# Patient Record
Sex: MALE | Race: BLACK OR AFRICAN AMERICAN | NOT HISPANIC OR LATINO | Employment: PART TIME | URBAN - METROPOLITAN AREA
[De-identification: names, ages, dates, MRNs, and addresses within clinical notes are randomized per-mention and may not be internally consistent; named-entity substitution may affect disease eponyms.]

---

## 2020-02-07 ENCOUNTER — HOSPITAL ENCOUNTER (EMERGENCY)
Facility: CLINIC | Age: 35
Discharge: HOME | End: 2020-02-07
Attending: FAMILY MEDICINE | Admitting: FAMILY MEDICINE
Payer: COMMERCIAL

## 2020-02-07 ENCOUNTER — APPOINTMENT (OUTPATIENT)
Dept: GENERAL RADIOLOGY | Facility: CLINIC | Age: 35
End: 2020-02-07
Attending: FAMILY MEDICINE
Payer: COMMERCIAL

## 2020-02-07 VITALS
TEMPERATURE: 102.4 F | RESPIRATION RATE: 16 BRPM | SYSTOLIC BLOOD PRESSURE: 132 MMHG | DIASTOLIC BLOOD PRESSURE: 67 MMHG | OXYGEN SATURATION: 99 % | HEART RATE: 98 BPM

## 2020-02-07 DIAGNOSIS — J06.9 VIRAL URI WITH COUGH: ICD-10-CM

## 2020-02-07 LAB
FLUAV+FLUBV AG SPEC QL: NEGATIVE
FLUAV+FLUBV AG SPEC QL: NEGATIVE
SPECIMEN SOURCE: NORMAL

## 2020-02-07 PROCEDURE — 25000132 ZZH RX MED GY IP 250 OP 250 PS 637: Performed by: FAMILY MEDICINE

## 2020-02-07 PROCEDURE — 87804 INFLUENZA ASSAY W/OPTIC: CPT | Performed by: FAMILY MEDICINE

## 2020-02-07 PROCEDURE — 99284 EMERGENCY DEPT VISIT MOD MDM: CPT | Mod: 25 | Performed by: FAMILY MEDICINE

## 2020-02-07 PROCEDURE — 99284 EMERGENCY DEPT VISIT MOD MDM: CPT | Mod: Z6 | Performed by: FAMILY MEDICINE

## 2020-02-07 PROCEDURE — 71046 X-RAY EXAM CHEST 2 VIEWS: CPT

## 2020-02-07 RX ORDER — IBUPROFEN 200 MG
600 TABLET ORAL EVERY 6 HOURS PRN
Qty: 30 TABLET | Refills: 0 | Status: SHIPPED | OUTPATIENT
Start: 2020-02-07

## 2020-02-07 RX ORDER — ACETAMINOPHEN 500 MG
1000 TABLET ORAL ONCE
Status: COMPLETED | OUTPATIENT
Start: 2020-02-07 | End: 2020-02-07

## 2020-02-07 RX ADMIN — ACETAMINOPHEN 1000 MG: 500 TABLET ORAL at 19:25

## 2020-02-07 NOTE — ED AVS SNAPSHOT
[employer], [employer], Emergency Department  [address]  [facility] [address]  Phone:  [phone]  Fax:  [phone]                                    Amanuel Phillips   MRN: [id number]    Department:  [facility], Emergency Department   Date of Visit:  2/7/2020           After Visit Summary Signature Page    I have received my discharge instructions, and my questions have been answered. I have discussed any challenges I see with this plan with the nurse or doctor.    ..........................................................................................................................................  Patient/Patient Representative Signature      ..........................................................................................................................................  Patient Representative Print Name and Relationship to Patient    ..................................................               ................................................  Date                                   Time    ..........................................................................................................................................  Reviewed by Signature/Title    ...................................................              ..............................................  Date                                               Time          22EPIC Rev 08/18

## 2020-02-07 NOTE — LETTER
February 7, 2020      To Whom It May Concern:      Amanuel Phillips was seen in our Emergency Department today, 02/07/20.  I expect his condition to improve over the next 2-3 days.  He may return to work/school when improved.    Sincerely,        Vladimir Ramirez MD

## 2020-02-08 NOTE — ED PROVIDER NOTES
[facility] EMERGENCY DEPARTMENT ([facility])     February 7, 2020    History     Chief Complaint   Patient presents with     URI     HPI  Amanuel Phillips is a 35 year old male who presents to the ED for evaluation of a fever and dry cough. Patient reports his symptoms started 2 days ago. He states he has not checked his temperature, but he feels very hot at night. He denies runny nose, sore throat, myalgias, shortness of breath, abdominal pain, or swelling in his bilateral lower extremities. Patient denies recent travel. He denies ill contacts. He is a non-smoker. He reports he did not take Tylenol or Ibuprofen today. He denies previous history of asthma. He states he is otherwise healthy.    PAST MEDICAL HISTORY  History reviewed. No pertinent past medical history.  PAST SURGICAL HISTORY  History reviewed. No pertinent surgical history.  FAMILY HISTORY  History reviewed. No pertinent family history.  SOCIAL HISTORY  Social History     Tobacco Use     Smoking status: Never Smoker     Smokeless tobacco: Never Used   Substance Use Topics     Alcohol use: Not Currently     MEDICATIONS  No current facility-administered medications for this encounter.      Current Outpatient Medications   Medication     Benzocaine-Menthol 10-2.1 MG LOZG     ibuprofen (ADVIL/MOTRIN) 200 MG tablet     ALLERGIES  Allergies   Allergen Reactions     Bactrim [Sulfamethoxazole W/Trimethoprim] Rash              I have reviewed the Medications, Allergies, Past Medical and Surgical History, and Social History in the Epic system.    Review of Systems     ROS: 14 point ROS neg other than the symptoms noted above in the HPI.      Physical Exam   BP: 132/67  Pulse: 98  Temp: 102.4  F (39.1  C)  Resp: 16  SpO2: 99 %      Physical Exam  Constitutional:       General: He is not in acute distress.     Appearance: He is not diaphoretic.   HENT:      Head: Atraumatic.   Eyes:      General: No scleral icterus.     Pupils: Pupils are equal, round,  and reactive to light.   Neck:      Musculoskeletal: Neck supple.   Cardiovascular:      Heart sounds: Normal heart sounds.   Pulmonary:      Effort: No respiratory distress.      Breath sounds: Normal breath sounds.   Abdominal:      General: Bowel sounds are normal.      Palpations: Abdomen is soft.      Tenderness: There is no abdominal tenderness.   Musculoskeletal:         General: No tenderness.   Lymphadenopathy:      Cervical: No cervical adenopathy.   Skin:     General: Skin is warm.      Findings: No rash.   Neurological:      General: No focal deficit present.      Mental Status: He is alert.         ED Course        Procedures                           Labs Ordered and Resulted from Time of ED Arrival Up to the Time of Departure from the ED   INFLUENZA A/B ANTIGEN            Assessments & Plan (with Medical Decision Making)   Otherwise healthy 35-year-old man presenting with 2-day history of cough and fever.   Initial differential diagnosis includes acute upper respiratory infection (viral or less likely bacterial), acute or chronic bronchitis, pneumonia (again either bacterial or viral), airway tract inflammation due to allergies, asthma, pneumothorax, less likely other life-threatening causes of cough or shortness of breath such as airway foreign body, congestive heart failure or pulmonary embolism.  On exam he is elevated temperature, he is in no respiratory distress, oxygen saturation normal.  Examination of his oropharynx is normal.  His lungs are clear.  Cardiovascular exam normal.  No signs of volume overload.  No extremity tenderness or edema.  The remainder of a complete physical exam is normal.  His influenza swab was negative his chest x-ray is negative.  He improved with Tylenol.  This is most likely viral upper respiratory infection.  [place] have direct contact with [employer].  We will treat symptomatically for viral URI.  We discussed the indications for  emergency department return and follow-up.  Stable for discharge.      I have reviewed the nursing notes.    I have reviewed the findings, diagnosis, plan and need for follow up with the patient.    New Prescriptions    BENZOCAINE-MENTHOL 10-2.1 MG LOZG    Take 1 lozenge by mouth 4 times daily as needed (Cough or sore throat)    IBUPROFEN (ADVIL/MOTRIN) 200 MG TABLET    Take 3 tablets (600 mg) by mouth every 6 hours as needed for mild pain       Final diagnoses:   Viral URI with cough     I, Bri Sena, am serving as a trained medical scribe to document services personally performed by Vladimir Ramirez MD, based on the provider's statements to me.      I, Vldaimir Ramirez MD, was physically present and have reviewed and verified the accuracy of this note documented by Bri Sena.     2/7/2020   [facility], [place], EMERGENCY DEPARTMENT     Vladimir Ramirez MD  02/07/20 2003

## 2020-02-08 NOTE — DISCHARGE INSTRUCTIONS
Thank you for choosing [facility].     Please closely monitor for further symptoms. Return to the Emergency Department if you develop any new or worsening signs or symptoms.    If you received any opiate pain medications or sedatives during your visit, please do not drive for at least 8 hours.     Labs, cultures or final xray interpretations may still need to be reviewed.  We will call you if your plan of care needs to be changed.    Please follow up with your primary care physician or clinic.

## 2020-02-08 NOTE — ED TRIAGE NOTES
Pt complaining of URI signs and symptoms for the past few days with a fever.  Pt placed in a mask.

## 2022-03-25 ENCOUNTER — MEDICAL CORRESPONDENCE (OUTPATIENT)
Dept: HEALTH INFORMATION MANAGEMENT | Facility: CLINIC | Age: 37
End: 2022-03-25
Payer: COMMERCIAL

## 2022-03-28 ENCOUNTER — TRANSCRIBE ORDERS (OUTPATIENT)
Dept: OTHER | Age: 37
End: 2022-03-28
Payer: COMMERCIAL

## 2022-03-28 DIAGNOSIS — E11.9 TYPE 2 DIABETES MELLITUS WITHOUT COMPLICATION, WITHOUT LONG-TERM CURRENT USE OF INSULIN (H): Primary | ICD-10-CM

## 2022-05-04 ENCOUNTER — TELEPHONE (OUTPATIENT)
Dept: OPHTHALMOLOGY | Facility: CLINIC | Age: 37
End: 2022-05-04
Payer: COMMERCIAL

## 2023-03-22 ENCOUNTER — APPOINTMENT (OUTPATIENT)
Dept: GENERAL RADIOLOGY | Facility: CLINIC | Age: 38
End: 2023-03-22
Attending: EMERGENCY MEDICINE
Payer: COMMERCIAL

## 2023-03-22 ENCOUNTER — HOSPITAL ENCOUNTER (EMERGENCY)
Facility: CLINIC | Age: 38
Discharge: HOME | End: 2023-03-22
Attending: EMERGENCY MEDICINE | Admitting: EMERGENCY MEDICINE
Payer: COMMERCIAL

## 2023-03-22 VITALS
WEIGHT: 173.4 LBS | SYSTOLIC BLOOD PRESSURE: 137 MMHG | RESPIRATION RATE: 16 BRPM | HEART RATE: 89 BPM | OXYGEN SATURATION: 100 % | TEMPERATURE: 98.1 F | DIASTOLIC BLOOD PRESSURE: 81 MMHG

## 2023-03-22 DIAGNOSIS — R07.9 CHEST PAIN, UNSPECIFIED TYPE: ICD-10-CM

## 2023-03-22 DIAGNOSIS — K21.9 GASTROESOPHAGEAL REFLUX DISEASE, UNSPECIFIED WHETHER ESOPHAGITIS PRESENT: ICD-10-CM

## 2023-03-22 PROCEDURE — 250N000009 HC RX 250: Performed by: EMERGENCY MEDICINE

## 2023-03-22 PROCEDURE — 71046 X-RAY EXAM CHEST 2 VIEWS: CPT

## 2023-03-22 PROCEDURE — 93005 ELECTROCARDIOGRAM TRACING: CPT | Performed by: EMERGENCY MEDICINE

## 2023-03-22 PROCEDURE — 93010 ELECTROCARDIOGRAM REPORT: CPT | Performed by: EMERGENCY MEDICINE

## 2023-03-22 PROCEDURE — 99284 EMERGENCY DEPT VISIT MOD MDM: CPT | Mod: 25 | Performed by: EMERGENCY MEDICINE

## 2023-03-22 PROCEDURE — 250N000013 HC RX MED GY IP 250 OP 250 PS 637: Performed by: EMERGENCY MEDICINE

## 2023-03-22 RX ADMIN — ALUMINUM HYDROXIDE, MAGNESIUM HYDROXIDE, AND DIMETHICONE 30 ML: 200; 20; 200 SUSPENSION ORAL at 22:21

## 2023-03-22 ASSESSMENT — ACTIVITIES OF DAILY LIVING (ADL): ADLS_ACUITY_SCORE: 35

## 2023-03-23 LAB
ATRIAL RATE - MUSE: 91 BPM
DIASTOLIC BLOOD PRESSURE - MUSE: NORMAL MMHG
INTERPRETATION ECG - MUSE: NORMAL
P AXIS - MUSE: 72 DEGREES
PR INTERVAL - MUSE: 146 MS
QRS DURATION - MUSE: 104 MS
QT - MUSE: 338 MS
QTC - MUSE: 415 MS
R AXIS - MUSE: 64 DEGREES
SYSTOLIC BLOOD PRESSURE - MUSE: NORMAL MMHG
T AXIS - MUSE: 38 DEGREES
VENTRICULAR RATE- MUSE: 91 BPM

## 2023-03-23 NOTE — DISCHARGE INSTRUCTIONS
Take omeprazole in the morning with a glass of water.  Take 30 minutes before you eat or drink anything else.  Follow-up with your primary clinic for further evaluation of your screening test results that you had performed earlier last week.    Return to the emergency department if any concerns.

## 2023-03-23 NOTE — ED PROVIDER NOTES
[facility] EMERGENCY DEPARTMENT ([facility])    3/22/23      ED PROVIDER NOTE  ED 19    History     Chief Complaint   Patient presents with     Chest Pain     Abdominal Pain     HPI  Amanuel Phillips is a 38 year old male with history of type II diabetes who presents with chest pain and abdominal pain.  Patient just returned from [place] 3 weeks ago.   The patient presented to his primary care doctor 5 days ago for routine physical exam.  His only complaint was some epigastric burning for which he was prescribed omeprazole.  Screening tests were performed.  [facility] records reviewed.  He was seen by his primary care doctor on 3/17/2023 through the [facility].  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  Apparently, the patient was instructed to come to the emergency department to get a chest radiograph.  He does report occasional burning chest pain associated with epigastric burning.  He denies any fever.  No cough.  No dyspnea.  No leg pain or swelling.    Patient has had 2 doses of the Moderna COVID-19 vaccine, no boosters.  He also received his flu shot for the year.  Past Medical History  Past Medical History:   Diagnosis Date     Diabetes (H)      History reviewed. No pertinent surgical history.  Benzocaine-Menthol 10-2.1 MG LOZG  ibuprofen (ADVIL/MOTRIN) 200 MG tablet      Allergies   Allergen Reactions     Bactrim [Sulfamethoxazole W/Trimethoprim] Rash            Family History  History reviewed. No pertinent family history.  Social History   Social History     Tobacco Use     Smoking status: Never     Smokeless tobacco: Never   Substance Use Topics     Alcohol use: Not Currently     Drug use: Not Currently         A medically appropriate review of systems was performed with pertinent positives and negatives noted in the HPI, and all other systems negative.    Physical Exam   BP: (!) 154/82  Pulse: 97  Temp: 98  F (36.7  C)  Resp: 16  Weight: 78.7 kg (173 lb  6.4 oz)  SpO2: 100 %  Physical Exam  Vitals and nursing note reviewed.   Constitutional:       General: He is not in acute distress.     Appearance: He is well-developed. He is not diaphoretic.   HENT:      Head: Atraumatic.   Eyes:      General: No scleral icterus.     Pupils: Pupils are equal, round, and reactive to light.   Cardiovascular:      Rate and Rhythm: Normal rate and regular rhythm.      Heart sounds: Normal heart sounds.   Pulmonary:      Effort: Pulmonary effort is normal. No respiratory distress.      Breath sounds: Normal breath sounds.   Abdominal:      General: Bowel sounds are normal.      Palpations: Abdomen is soft.      Tenderness: There is no abdominal tenderness.   Musculoskeletal:         General: No tenderness.      Right lower leg: No tenderness. No edema.      Left lower leg: No tenderness. No edema.   Skin:     General: Skin is warm.      Findings: No rash.   Neurological:      Mental Status: He is alert.           ED Course, Procedures, & Data      Procedures       ED Course Selections:        EKG Interpretation:      Interpreted by LINDA HOLGUIN MD, MD  Time reviewed: 2130  Symptoms at time of EKG: chest pain   Rhythm: normal sinus   Rate: 91  Axis: normal  Ectopy: none  Conduction: normal  ST Segments/ T Waves: No ST-T wave changes  Q Waves: none  Comparison to prior: No old EKG available    Clinical Impression: normal EKG                     Results for orders placed or performed during the hospital encounter of 03/22/23   XR Chest 2 Views     Status: None    Narrative    EXAM: XR CHEST 2 VIEWS  LOCATION: [facility]  DATE/TIME: 3/22/2023 10:51 PM    INDICATION: chest pain  COMPARISON: 02/07/2020      Impression    IMPRESSION: Negative chest.   EKG 12 lead     Status: None (Preliminary result)   Result Value Ref Range    Systolic Blood Pressure  mmHg    Diastolic Blood Pressure  mmHg    Ventricular Rate 91 BPM    Atrial Rate 91 BPM    WA  Interval 146 ms    QRS Duration 104 ms     ms    QTc 415 ms    P Axis 72 degrees    R AXIS 64 degrees    T Axis 38 degrees    Interpretation ECG Sinus rhythm  Normal ECG        Medications   lidocaine (viscous) (XYLOCAINE) 2 % 15 mL, alum & mag hydroxide-simethicone (MAALOX) 15 mL GI Cocktail (30 mLs Oral $Given 3/22/23 1367)     Labs Ordered and Resulted from Time of ED Arrival to Time of ED Departure - No data to display  XR Chest 2 Views   Final Result   IMPRESSION: Negative chest.             Critical care was not performed.     Medical Decision Making  The patient's presentation was of moderate complexity (an undiagnosed new problem with uncertain diagnosis).    The patient's evaluation involved:  review of external note(s) from 1 sources (see separate area of note for details)  ordering and/or review of 2 test(s) in this encounter (see separate area of note for details)  review of 1 test result(s) ordered prior to this encounter (see separate area of note for details)    The patient's management necessitated only low risk treatment.      Assessment & Plan    38 year old male to the emergency department for evaluation of epigastric and retrosternal burning sensation.  He saw his primary care physician recently was started on omeprazole.  Differential includes ACS, pneumonia, pneumothorax, GERD.  He has normal vital signs and no symptoms of DVT or PE so do not suspect pulmonary embolism.  Patient's EKG is normal.  Chest radiograph also is normal.  Symptoms resolved with GI cocktail.  Suspect GERD.  Patient instructed to take his omeprazole in the morning 30 minutes  before eating or drinking anything else.  The patient did have a positive QuantiFERON gold test out of his clinic 5 days ago.  He is asymptomatic for tuberculosis and does not have any abnormality on his chest radiograph.  He is referred back to his primary care clinic for follow-up on his positive QuantiFERON gold test.    I have reviewed the  nursing notes. I have reviewed the findings, diagnosis, plan and need for follow up with the patient.    Discharge Medication List as of 3/22/2023 11:20 PM          Final diagnoses:   Gastroesophageal reflux disease, unspecified whether esophagitis present     Chart documentation was completed with Dragon voice-recognition software. Even though reviewed, this chart may still contain some grammatical, spelling, and word errors.     Dayron Light Md  [facility] EMERGENCY DEPARTMENT  3/22/2023     Dayron Light MD  03/23/23 0303

## 2023-03-23 NOTE — ED TRIAGE NOTES
Patient said he came back from [place] 3 weeks ago. He believed he has lung infection. He said he went to the doctor and he was told  to get an x ray. Patient also complained of intermittent pain to abdomen especially in the morning before breakfast.      Triage Assessment     Row Name 03/22/23 5548       Triage Assessment (Adult)    Airway WDL WDL       Respiratory WDL    Respiratory WDL WDL       Skin Circulation/Temperature WDL    Skin Circulation/Temperature WDL WDL       Cardiac WDL    Cardiac WDL X  pt reported pain to right anterior chest.       Peripheral/Neurovascular WDL    Peripheral Neurovascular WDL WDL       Cognitive/Neuro/Behavioral WDL    Cognitive/Neuro/Behavioral WDL WDL

## 2025-06-16 ENCOUNTER — HOSPITAL ENCOUNTER (EMERGENCY)
Facility: CLINIC | Age: 40
Discharge: HOME | End: 2025-06-16
Attending: EMERGENCY MEDICINE | Admitting: EMERGENCY MEDICINE
Payer: COMMERCIAL

## 2025-06-16 VITALS
BODY MASS INDEX: 22.44 KG/M2 | TEMPERATURE: 98.5 F | DIASTOLIC BLOOD PRESSURE: 81 MMHG | RESPIRATION RATE: 16 BRPM | HEART RATE: 93 BPM | OXYGEN SATURATION: 100 % | HEIGHT: 73 IN | WEIGHT: 169.3 LBS | SYSTOLIC BLOOD PRESSURE: 143 MMHG

## 2025-06-16 DIAGNOSIS — G44.209 TENSION HEADACHE: ICD-10-CM

## 2025-06-16 LAB — GLUCOSE BLDC GLUCOMTR-MCNC: 125 MG/DL (ref 70–99)

## 2025-06-16 PROCEDURE — 99283 EMERGENCY DEPT VISIT LOW MDM: CPT | Performed by: EMERGENCY MEDICINE

## 2025-06-16 PROCEDURE — 82962 GLUCOSE BLOOD TEST: CPT

## 2025-06-16 RX ORDER — IBUPROFEN 600 MG/1
600 TABLET, FILM COATED ORAL EVERY 6 HOURS PRN
Qty: 30 TABLET | Refills: 0 | Status: SHIPPED | OUTPATIENT
Start: 2025-06-16

## 2025-06-16 RX ORDER — ACETAMINOPHEN 325 MG/1
325-650 TABLET ORAL EVERY 6 HOURS PRN
Qty: 30 TABLET | Refills: 0 | Status: SHIPPED | OUTPATIENT
Start: 2025-06-16

## 2025-06-16 ASSESSMENT — COLUMBIA-SUICIDE SEVERITY RATING SCALE - C-SSRS
1. IN THE PAST MONTH, HAVE YOU WISHED YOU WERE DEAD OR WISHED YOU COULD GO TO SLEEP AND NOT WAKE UP?: NO
6. HAVE YOU EVER DONE ANYTHING, STARTED TO DO ANYTHING, OR PREPARED TO DO ANYTHING TO END YOUR LIFE?: NO
2. HAVE YOU ACTUALLY HAD ANY THOUGHTS OF KILLING YOURSELF IN THE PAST MONTH?: NO

## 2025-06-17 NOTE — DISCHARGE INSTRUCTIONS
Please follow-up with your primary care provider if your symptoms or not improving.  Discussed treatments as a physical therapy and further medications.    Return to the emergency department if you develop fevers, worsening symptoms or if you have any further concerns

## 2025-06-17 NOTE — ED TRIAGE NOTES
Triage Assessment (Adult)       Row Name 06/16/25 2141          Triage Assessment    Airway WDL WDL        Respiratory WDL    Respiratory WDL WDL        Skin Circulation/Temperature WDL    Skin Circulation/Temperature WDL WDL

## 2025-06-17 NOTE — ED PROVIDER NOTES
"    [facility] EMERGENCY DEPARTMENT ([facility])    6/16/25      ED PROVIDER NOTE    History     Chief Complaint   Patient presents with    Headache     Headache and neck pain for 3 nights. Hx of DM2.  at 1500.     HPI  Amanuel Phillips is a 40 year old male with a history of T2DM who presents to the ED with headache and neck pain.  Patient resenting with head and neck pain for 3 nights after sleeping on it funny.  Denies numbness tingling or weakness.  No falls or injuries.  He is not on blood thinners.  No fevers.  No vomiting.  No vision changes.    Past Medical History  Past Medical History:   Diagnosis Date    Diabetes (H)      History reviewed. No pertinent surgical history.  Benzocaine-Menthol 10-2.1 MG LOZG  ibuprofen (ADVIL/MOTRIN) 200 MG tablet      Allergies   Allergen Reactions    Bactrim [Sulfamethoxazole-Trimethoprim] Rash            Family History  History reviewed. No pertinent family history.  Social History   Social History     Tobacco Use    Smoking status: Never    Smokeless tobacco: Never   Substance Use Topics    Alcohol use: Not Currently    Drug use: Not Currently      Past medical history, past surgical history, medications, allergies, family history, and social history were reviewed with the patient. No additional pertinent items.      ROS: 14 point ROS neg other than the symptoms noted above in the HPI.    Physical Exam   BP: (!) 143/81  Pulse: 93  Temp: 98.5  F (36.9  C)  Resp: 16  Height: 185.4 cm (6' 1\")  Weight: 76.8 kg (169 lb 4.8 oz)  SpO2: 100 %    Physical Exam  Physical Exam   Constitutional: oriented to person, place, and time. appears well-developed and well-nourished.   HENT:   Head: Normocephalic and atraumatic.  No tenderness.  Neck: Normal range of motion. No tenderness over the C-spine.  No tenderness over the paraspinous muscles.  Pulmonary/Chest: Effort normal. No respiratory distress.   Cardiac: No murmurs, rubs, gallops. RRR.  Abdominal: Abdomen soft, " nontender, nondistended. No rebound tenderness.  MSK: Long bones without deformity or evidence of trauma  Neurological: alert and oriented to person, place, and time.  Stable gait.  No focal defects.  GCS 15.  Skin: Skin is warm and dry.   Psychiatric:  normal mood and affect.  behavior is normal. Thought content normal.     ED Course, Procedures, & Data      Procedures          Results for orders placed or performed during the hospital encounter of 06/16/25   Glucose by meter     Status: Abnormal   Result Value Ref Range    GLUCOSE BY METER POCT 125 (H) 70 - 99 mg/dL     Medications - No data to display  Labs Ordered and Resulted from Time of ED Arrival to Time of ED Departure   GLUCOSE BY METER - Abnormal       Result Value    GLUCOSE BY METER POCT 125 (*)    GLUCOSE MONITOR NURSING POCT     No orders to display          Critical care was not performed.     Medical Decision Making  The patient's presentation was of straightforward complexity (a clearly self-limited or minor problem).    The patient's evaluation involved:  history and exam without other MDM data elements    The patient's management necessitated moderate risk (prescription drug management including medications given in the ED).    Assessment & Plan    Patient resenting with headache that is likely tension.  He reports pain in the side of his neck after waking up and goes up into his head.  No falls or injuries.  No indication for imaging.  No signs of meningitis encephalitis.  He has a normal neuroexam.  Discussed with Tylenol ibuprofen and follow-up with his primary care provider    I have reviewed the nursing notes. I have reviewed the findings, diagnosis, plan and need for follow up with the patient.    New Prescriptions    No medications on file       Final diagnoses:   Tension headache       Yao Barrios MD  [facility] EMERGENCY DEPARTMENT  6/16/2025     Yao Barrios MD  06/16/25 2252